# Patient Record
Sex: FEMALE | Race: WHITE
[De-identification: names, ages, dates, MRNs, and addresses within clinical notes are randomized per-mention and may not be internally consistent; named-entity substitution may affect disease eponyms.]

---

## 2018-03-10 ENCOUNTER — HOSPITAL ENCOUNTER (EMERGENCY)
Dept: HOSPITAL 62 - ER | Age: 28
Discharge: HOME | End: 2018-03-10
Payer: SELF-PAY

## 2018-03-10 VITALS — DIASTOLIC BLOOD PRESSURE: 88 MMHG | SYSTOLIC BLOOD PRESSURE: 138 MMHG

## 2018-03-10 DIAGNOSIS — M79.605: ICD-10-CM

## 2018-03-10 DIAGNOSIS — M25.512: ICD-10-CM

## 2018-03-10 DIAGNOSIS — M25.572: ICD-10-CM

## 2018-03-10 DIAGNOSIS — M79.604: Primary | ICD-10-CM

## 2018-03-10 DIAGNOSIS — M25.511: ICD-10-CM

## 2018-03-10 DIAGNOSIS — M79.601: ICD-10-CM

## 2018-03-10 DIAGNOSIS — M79.673: ICD-10-CM

## 2018-03-10 DIAGNOSIS — M79.602: ICD-10-CM

## 2018-03-10 DIAGNOSIS — G89.29: ICD-10-CM

## 2018-03-10 DIAGNOSIS — F17.200: ICD-10-CM

## 2018-03-10 DIAGNOSIS — M25.571: ICD-10-CM

## 2018-03-10 DIAGNOSIS — J45.909: ICD-10-CM

## 2018-03-10 DIAGNOSIS — M54.2: ICD-10-CM

## 2018-03-10 LAB
ADD MANUAL DIFF: NO
ALBUMIN SERPL-MCNC: 4.9 G/DL (ref 3.5–5)
ALP SERPL-CCNC: 91 U/L (ref 38–126)
ALT SERPL-CCNC: 74 U/L (ref 9–52)
ANION GAP SERPL CALC-SCNC: 13 MMOL/L (ref 5–19)
APPEARANCE UR: CLEAR
APTT PPP: YELLOW S
AST SERPL-CCNC: 28 U/L (ref 14–36)
BASOPHILS # BLD AUTO: 0 10^3/UL (ref 0–0.2)
BASOPHILS NFR BLD AUTO: 0.5 % (ref 0–2)
BILIRUB DIRECT SERPL-MCNC: 0.4 MG/DL (ref 0–0.4)
BILIRUB SERPL-MCNC: 0.7 MG/DL (ref 0.2–1.3)
BILIRUB UR QL STRIP: NEGATIVE
BUN SERPL-MCNC: 9 MG/DL (ref 7–20)
CALCIUM: 10.4 MG/DL (ref 8.4–10.2)
CHLORIDE SERPL-SCNC: 101 MMOL/L (ref 98–107)
CO2 SERPL-SCNC: 26 MMOL/L (ref 22–30)
EOSINOPHIL # BLD AUTO: 0.2 10^3/UL (ref 0–0.6)
EOSINOPHIL NFR BLD AUTO: 2.4 % (ref 0–6)
ERYTHROCYTE [DISTWIDTH] IN BLOOD BY AUTOMATED COUNT: 12.7 % (ref 11.5–14)
GLUCOSE SERPL-MCNC: 92 MG/DL (ref 75–110)
GLUCOSE UR STRIP-MCNC: NEGATIVE MG/DL
HCT VFR BLD CALC: 47.3 % (ref 36–47)
HGB BLD-MCNC: 16.3 G/DL (ref 12–15.5)
KETONES UR STRIP-MCNC: 20 MG/DL
LYMPHOCYTES # BLD AUTO: 2.7 10^3/UL (ref 0.5–4.7)
LYMPHOCYTES NFR BLD AUTO: 30 % (ref 13–45)
MCH RBC QN AUTO: 32.1 PG (ref 27–33.4)
MCHC RBC AUTO-ENTMCNC: 34.5 G/DL (ref 32–36)
MCV RBC AUTO: 93 FL (ref 80–97)
MONOCYTES # BLD AUTO: 0.5 10^3/UL (ref 0.1–1.4)
MONOCYTES NFR BLD AUTO: 5.9 % (ref 3–13)
NEUTROPHILS # BLD AUTO: 5.6 10^3/UL (ref 1.7–8.2)
NEUTS SEG NFR BLD AUTO: 61.2 % (ref 42–78)
NITRITE UR QL STRIP: NEGATIVE
PH UR STRIP: 6 [PH] (ref 5–9)
PLATELET # BLD: 283 10^3/UL (ref 150–450)
POTASSIUM SERPL-SCNC: 4.7 MMOL/L (ref 3.6–5)
PROT SERPL-MCNC: 8.3 G/DL (ref 6.3–8.2)
PROT UR STRIP-MCNC: NEGATIVE MG/DL
RBC # BLD AUTO: 5.08 10^6/UL (ref 3.72–5.28)
SODIUM SERPL-SCNC: 139.5 MMOL/L (ref 137–145)
SP GR UR STRIP: 1
TOTAL CELLS COUNTED % (AUTO): 100 %
UROBILINOGEN UR-MCNC: NEGATIVE MG/DL (ref ?–2)
WBC # BLD AUTO: 9.1 10^3/UL (ref 4–10.5)

## 2018-03-10 PROCEDURE — 81025 URINE PREGNANCY TEST: CPT

## 2018-03-10 PROCEDURE — 81001 URINALYSIS AUTO W/SCOPE: CPT

## 2018-03-10 PROCEDURE — 82550 ASSAY OF CK (CPK): CPT

## 2018-03-10 PROCEDURE — 85025 COMPLETE CBC W/AUTO DIFF WBC: CPT

## 2018-03-10 PROCEDURE — 99283 EMERGENCY DEPT VISIT LOW MDM: CPT

## 2018-03-10 PROCEDURE — 36415 COLL VENOUS BLD VENIPUNCTURE: CPT

## 2018-03-10 PROCEDURE — 80053 COMPREHEN METABOLIC PANEL: CPT

## 2018-03-10 NOTE — ER DOCUMENT REPORT
ED Medical Screen (RME)





- General


Chief Complaint: Pain


Stated Complaint: LEG/NECK/ARM PAIN


Time Seen by Provider: 03/10/18 17:09


Mode of Arrival: Wheelchair


Information source: Patient


TRAVEL OUTSIDE OF THE [place] IN LAST 30 DAYS: No





- HPI


Patient complains to provider of: pain in feet, arms, neck, legs


Onset: Other - pt states she hsa had pain in her feet, arms, neck , and legs 

for several days.  Denies h/o trauma





- Related Data


Allergies/Adverse Reactions: 


 





No Known Allergies Allergy (Verified 03/10/18 15:48)


 











Past Medical History


Pulmonary Medical History: Reports: Hx Asthma


GI Medical History: Reports: Hx Gastroesophageal Reflux Disease, Hx Ulcer





- Immunizations


Hx Diphtheria, Pertussis, Tetanus Vaccination: Yes - 2012





Physical Exam





- Vital signs


Vitals: 





 











Temp Pulse Resp BP Pulse Ox


 


 98.4 F   92   16   142/92 H  97 


 


 03/10/18 16:14  03/10/18 16:14  03/10/18 16:14  03/10/18 16:14  03/10/18 16:14














Course





- Vital Signs


Vital signs: 





 











Temp Pulse Resp BP Pulse Ox


 


 98.4 F   92   16   142/92 H  97 


 


 03/10/18 16:14  03/10/18 16:14  03/10/18 16:14  03/10/18 16:14  03/10/18 16:14

## 2018-03-10 NOTE — ER DOCUMENT REPORT
ED General





- General


Chief Complaint: Pain


Stated Complaint: LEG/NECK/ARM PAIN


Time Seen by Provider: 03/10/18 17:09


Mode of Arrival: Wheelchair


Notes: 





28-year-old female presents with foot and leg pain chronic for several weeks, 

she attributed to plantar fasciitis.  It is worse when she lays down.  She has 

no numbness or tingling, no injury no trauma no muscle swelling.  No changes in 

urination.  Previously had multiple x-rays and is been seeing primary care 

doctor [place] out of state but is to stop itching care here.  No fever or chills.


TRAVEL OUTSIDE OF THE [place] IN LAST 30 DAYS: No





- Related Data


Allergies/Adverse Reactions: 


 





No Known Allergies Allergy (Verified 03/10/18 17:10)


 











Past Medical History





- General


Information source: Patient





- Social History


Smoking Status: Current Every Day Smoker


Frequency of alcohol use: None


Drug Abuse: None


Family History: Reviewed & Not Pertinent


Patient has suicidal ideation: No


Patient has homicidal ideation: No


Pulmonary Medical History: Reports: Hx Asthma


Renal/ Medical History: Denies: Hx Peritoneal Dialysis


GI Medical History: Reports: Hx Gastroesophageal Reflux Disease, Hx Ulcer





- Immunizations


Hx Diphtheria, Pertussis, Tetanus Vaccination: Yes - 2012





Review of Systems





- Review of Systems


Notes: 





REVIEW OF SYSTEMS





GEN: Denies fever, chills, weight loss


ENT: Denies sore throat, nasal discharge, ear pain


EYES: Denies blurry vision, eye pain, discharge


CV: Denies chest pain, palpitations, edema


RESP: Denies cough, shortness of breath, wheezing


GI: Denies abdominal pain, nausea, vomiting, diarrhea


MSK: Bilateral arm shoulder neck foot and ankle pain


SKIN: Denies rash, skin lesions.  Thinks that she has a easy bruising on her 

feet.


LYMPH: Denies swollen glands/lymph nodes


NEURO: Denies headache, focal weakness or numbness, dizziness


PSYCH: Denies depression, suicidal or homicidal ideation








PHYSICAL EXAMINATION





General: No acute distress, well-nourished


Head: Atraumatic, normocephalic


ENT: Mouth normal, oropharynx moist, no exudates or tonsillar enlargement


Eyes: Conjunctiva normal, pupils equal, lids normal


Neck: No JVD, supple, no guarding


CVS: Normal rate, regular rhythm, no murmurs


Resp: No resp distress, equal and normal breath sounds bilaterally


GI: Nondistended, soft, no tenderness to palpation, no rebound or guarding


Ext: No deformities, no edema, normal range of motion in upper and lower ext


Back: No CVA or midline TTP


Skin: No rash, warm


Lymphatic: No lymphadeopathy noted


Neuro: Awake, alert.  Face symmetric.  GCS 15.





Physical Exam





- Vital signs


Vitals: 


 











Temp Pulse Resp BP Pulse Ox


 


 98.4 F   92   16   142/92 H  97 


 


 03/10/18 16:14  03/10/18 16:14  03/10/18 16:14  03/10/18 16:14  03/10/18 16:14














Course





- Re-evaluation


Re-evalutation: 





03/10/18 18:35


Chronic musculoskeletal complaints without acute signs of septic arthritis, 

skin infections and sepsis or neurologic disease.  Patient looks well.  Labs 

were sent and are normal.  Recommend follow-up with primary care.  Doubt 

rhabdo.  I have discussed with the patient there likely diagnosis, aftercare 

plan, follow-up plans and my usual and customary return precautions.  They 

verbalized understanding of this.





- Vital Signs


Vital signs: 


 











Temp Pulse Resp BP Pulse Ox


 


 98.4 F   92   16   142/92 H  97 


 


 03/10/18 16:14  03/10/18 16:14  03/10/18 16:14  03/10/18 16:14  03/10/18 16:14














- Laboratory


Result Diagrams: 


 03/10/18 17:30





 03/10/18 17:30


Laboratory results interpreted by me: 


 











  03/10/18 03/10/18 03/10/18





  17:15 17:30 17:30


 


Hgb   16.3 H 


 


Hct   47.3 H 


 


Calcium    10.4 H


 


ALT    74 H


 


Total Protein    8.3 H


 


Urine Ketones  20 H  














Discharge





- Discharge


Clinical Impression: 


 Leg pain, bilateral





Condition: Good


Disposition: HOME, SELF-CARE


Instructions:  Chronic Pain Control ([facility])


Additional Instructions: 


Please take Motrin for pain keep well-hydrated and see her regular doctor 

within 1 week.  We did not find an emergency cause of your pain.